# Patient Record
Sex: MALE | Race: BLACK OR AFRICAN AMERICAN
[De-identification: names, ages, dates, MRNs, and addresses within clinical notes are randomized per-mention and may not be internally consistent; named-entity substitution may affect disease eponyms.]

---

## 2021-10-08 ENCOUNTER — HOSPITAL ENCOUNTER (EMERGENCY)
Dept: HOSPITAL 11 - JP.ED | Age: 56
Discharge: HOME | End: 2021-10-08
Payer: COMMERCIAL

## 2021-10-08 DIAGNOSIS — J45.909: ICD-10-CM

## 2021-10-08 DIAGNOSIS — Z79.899: ICD-10-CM

## 2021-10-08 DIAGNOSIS — D69.6: Primary | ICD-10-CM

## 2021-10-08 DIAGNOSIS — E78.00: ICD-10-CM

## 2021-10-08 DIAGNOSIS — I10: ICD-10-CM

## 2021-10-08 NOTE — EDM.PDOC
ED HPI GENERAL MEDICAL PROBLEM





- General


Chief Complaint: General


Stated Complaint: REFERRED FOR LOW PLATELETS


Time Seen by Provider: 10/08/21 18:33


Source of Information: Reports: Patient


History Limitations: Reports: No Limitations





- History of Present Illness


INITIAL COMMENTS - FREE TEXT/NARRATIVE: 


Maciel is a 56-year-old male who presents to the ED for evaluation of low plate

lets. Patient saw his regular provider at [facility] in [place], [place] and had 

blood work that showed that he had a platelet count of 14,000. This is new for 

the patient and he was told to come to the ED for evaluation. He has not had any

symptoms including breathing difficulty, bruising, rash, bleeding from the gums,

concentrated urine, back pain or abdominal pain. Does have a history of chronic 

neck pain with radiculopathy which he is on a chronic pain medication for. He 

does not take any medication that would account for low platelet count.





  ** Generalized


Pain Score (Numeric/FACES): 10





- Related Data


                                    Allergies











Allergy/AdvReac Type Severity Reaction Status Date / Time


 


No Known Allergies Allergy   Verified 10/08/21 17:57











Home Meds: 


                                    Home Meds





Escitalopram [Lexapro] 10 mg PO DAILY 10/08/21 [History]


Losartan [Cozaar] 50 mg PO DAILY 10/08/21 [History]


Triamcinolone Acetonide [Triamcinolone Acetonide 0.1% Crm] 1 applic TOP BID 

10/08/21 [History]


Zolpidem Tartrate 10 mg PO BEDTIME 10/08/21 [History]


atorvaSTATin [Lipitor] 40 mg PO BEDTIME 10/08/21 [History]


oxyCODONE HCl/Acetaminophen [Endocet 7.5-325 mg Tablet] 1 each PO BID PRN 

10/08/21 [History]











Past Medical History


HEENT History: Reports: Impaired Vision


Cardiovascular History: Reports: High Cholesterol, Hypertension


Respiratory History: Reports: Asthma


Musculoskeletal History: Reports: Neck Pain, Chronic





- Past Surgical History


Neurological Surgical History: Reports: C-Spine





Social & Family History





- Tobacco Use


Tobacco Use Status *Q: Never Tobacco User





- Caffeine Use


Caffeine Use: Reports: None





- Recreational Drug Use


Recreational Drug Use: No





ED ROS GENERAL





- Review of Systems


Review Of Systems: See Below


Constitutional: Reports: No Symptoms


HEENT: Reports: No Symptoms


Respiratory: Reports: No Symptoms


Cardiovascular: Reports: No Symptoms


Endocrine: Reports: No Symptoms


GI/Abdominal: Reports: No Symptoms


: Reports: No Symptoms


Musculoskeletal: Reports: No Symptoms


Skin: Reports: No Symptoms


Neurological: Reports: No Symptoms


Psychiatric: Reports: No Symptoms


Hematologic/Lymphatic: Reports: No Symptoms


Immunologic: Reports: No Symptoms





ED EXAM, GENERAL





- Physical Exam


Exam: See Below


Exam Limited By: No Limitations


General Appearance: Alert, No Apparent Distress


Eye Exam: Bilateral Eye: EOMI, PERRL


Throat/Mouth: Normal Inspection, Normal Oropharynx, No Airway Compromise


Head: Atraumatic, Normocephalic


Neck: Normal Inspection, Supple, Non-Tender, Full Range of Motion.  No: 

Lymphadenopathy (R), Lymphadenopathy (L)


Respiratory/Chest: No Respiratory Distress, Lungs Clear, Normal Breath Sounds


Cardiovascular: Normal Peripheral Pulses, Regular Rate, Rhythm, No Murmur


Peripheral Pulses: 2+: Radial (L), Radial (R)


GI/Abdominal: Normal Bowel Sounds, Soft, Non-Tender, No Distention


Extremities: Normal Inspection


Neurological: Alert, Oriented, Normal Cognition, No Motor/Sensory Deficits


Psychiatric: Normal Affect, Normal Mood


Skin Exam: Warm, Dry, Intact, Normal Color, No Rash





Course





- Vital Signs


Last Recorded V/S: 





                                Last Vital Signs











Temp  36.8 C   10/08/21 18:02


 


Pulse  74   10/08/21 21:51


 


Resp  16   10/08/21 18:02


 


BP  138/81   10/08/21 21:51


 


Pulse Ox  96   10/08/21 21:51














- Orders/Labs/Meds


Orders: 





                               Active Orders 24 hr











 Category Date Time Status


 


 HCV ANTIBODY Stat Lab  10/08/21 19:00 Received











Labs: 





                                Laboratory Tests











  10/08/21 10/08/21 10/08/21 Range/Units





  19:00 19:00 19:00 


 


WBC  9.7    (4.5-11.0)  K/uL


 


RBC  4.98    (4.30-5.90)  M/uL


 


Hgb  13.3    (12.0-15.0)  g/dL


 


Hct  40.9    (40.0-54.0)  %


 


MCV  82    (80-98)  fL


 


MCH  27    (27-31)  pg


 


MCHC  33    (32-36)  %


 


Plt Count  14 L*    (150-400)  K/uL


 


PT    10.4  (9.2-10.6)  sec


 


INR    1.0  


 


APTT    24.1  (21.4-31.8)  sec


 


Sodium   143   (140-148)  mmol/L


 


Potassium   3.8   (3.6-5.2)  mmol/L


 


Chloride   106   (100-108)  mmol/L


 


Carbon Dioxide   27   (21-32)  mmol/L


 


Anion Gap   10.2   (5.0-14.0)  mmol/L


 


BUN   18   (7-18)  mg/dL


 


Creatinine   1.1   (0.8-1.3)  mg/dL


 


Est Cr Clr Drug Dosing   77.96   mL/min


 


Estimated GFR (MDRD)   > 60   (>60)  


 


Glucose   103   ()  mg/dL


 


Calcium   8.9   (8.5-10.1)  mg/dL


 


Total Bilirubin   0.2   (0.2-1.0)  mg/dL


 


AST   14 L   (15-37)  U/L


 


ALT   36   (12-78)  U/L


 


Alkaline Phosphatase   86   ()  U/L


 


Total Protein   6.5   (6.4-8.2)  g/dL


 


Albumin   3.8   (3.4-5.0)  g/dL


 


Globulin   2.7   (2.3-3.5)  g/dL


 


Albumin/Globulin Ratio   1.4   (1.2-2.2)  


 


Urine Color     (YELLOW)  


 


Urine Appearance     (CLEAR)  


 


Urine pH     (5.0-8.0)  


 


Ur Specific Gravity     (1.008-1.030)  


 


Urine Protein     (NEGATIVE)  mg/dL


 


Urine Glucose (UA)     (NEGATIVE)  mg/dL


 


Urine Ketones     (NEGATIVE)  mg/dL


 


Urine Occult Blood     (NEGATIVE)  


 


Urine Nitrite     (NEGATIVE)  


 


Urine Bilirubin     (NEGATIVE)  


 


Urine Urobilinogen     (0.2-1.0)  EU/dL


 


Ur Leukocyte Esterase     (NEGATIVE)  


 


Urine RBC     (0-5)  


 


Urine WBC     (0-5)  


 


Ur Epithelial Cells     


 


Amorphous Sediment     


 


Urine Bacteria     


 


Urine Mucus     


 


HIV-1 Ab Rapid Screen     (NON-REACT.)  














  10/08/21 10/08/21 Range/Units





  19:00 19:41 


 


WBC    (4.5-11.0)  K/uL


 


RBC    (4.30-5.90)  M/uL


 


Hgb    (12.0-15.0)  g/dL


 


Hct    (40.0-54.0)  %


 


MCV    (80-98)  fL


 


MCH    (27-31)  pg


 


MCHC    (32-36)  %


 


Plt Count    (150-400)  K/uL


 


PT    (9.2-10.6)  sec


 


INR    


 


APTT    (21.4-31.8)  sec


 


Sodium    (140-148)  mmol/L


 


Potassium    (3.6-5.2)  mmol/L


 


Chloride    (100-108)  mmol/L


 


Carbon Dioxide    (21-32)  mmol/L


 


Anion Gap    (5.0-14.0)  mmol/L


 


BUN    (7-18)  mg/dL


 


Creatinine    (0.8-1.3)  mg/dL


 


Est Cr Clr Drug Dosing    mL/min


 


Estimated GFR (MDRD)    (>60)  


 


Glucose    ()  mg/dL


 


Calcium    (8.5-10.1)  mg/dL


 


Total Bilirubin    (0.2-1.0)  mg/dL


 


AST    (15-37)  U/L


 


ALT    (12-78)  U/L


 


Alkaline Phosphatase    ()  U/L


 


Total Protein    (6.4-8.2)  g/dL


 


Albumin    (3.4-5.0)  g/dL


 


Globulin    (2.3-3.5)  g/dL


 


Albumin/Globulin Ratio    (1.2-2.2)  


 


Urine Color   Yellow  (YELLOW)  


 


Urine Appearance   Clear  (CLEAR)  


 


Urine pH   7.5  (5.0-8.0)  


 


Ur Specific Gravity   1.025  (1.008-1.030)  


 


Urine Protein   Negative  (NEGATIVE)  mg/dL


 


Urine Glucose (UA)   Negative  (NEGATIVE)  mg/dL


 


Urine Ketones   Trace H  (NEGATIVE)  mg/dL


 


Urine Occult Blood   Negative  (NEGATIVE)  


 


Urine Nitrite   Negative  (NEGATIVE)  


 


Urine Bilirubin   Negative  (NEGATIVE)  


 


Urine Urobilinogen   2.0 H  (0.2-1.0)  EU/dL


 


Ur Leukocyte Esterase   Negative  (NEGATIVE)  


 


Urine RBC   0-5  (0-5)  


 


Urine WBC   0-5  (0-5)  


 


Ur Epithelial Cells   Rare  


 


Amorphous Sediment   Rare  


 


Urine Bacteria   Rare  


 


Urine Mucus   Rare  


 


HIV-1 Ab Rapid Screen  Non-reactive   (NON-REACT.)  














- Re-Assessments/Exams


Free Text/Narrative Re-Assessment/Exam: 





10/08/21 23:05 reviewed the patient's labs showing a normal leukocyte count of 

9.7, hemoglobin of 13.3, hematocrit of 40.9 and a platelet count of 18,000. The 

blood smear shows very large platelets but not megakaryocytes. There is no 

evidence for schistocytes, bite cells, sickle cells, or abnormal red cells. 

There is no evidence for hyper segmented neutrophils. The comprehensive 

metabolic panel is unremarkable and urinalysis is normal including specific 

gravity. HIV is negative and HCV is negative. 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 

thrombocytopenia purpura or ITP. It would be a little unusual at this age of his

 life to have a genetic mutation causing giant platelets, however, that is still

 in the differential. The patient should follow-up with his primary provider and

 will likely need to be referred to hematology for further work-up. I have 

vacated against the use of any NSAIDs as we do not want to promote bleeding. 

This should also include the antiplatelet drugs like Plavix or Brilinta. The 

patient should absolutely avoid aspirin. Indications return to the ED were 

discussed and he suitable for discharge in satisfactory condition.








Departure





- Departure


Time of Disposition: 23:00


Disposition: Home, Self-Care 01


Clinical Impression: 


 Thrombocytopenia








- Discharge Information


Instructions:  Thrombocytopenia


Referrals: 


JOSEFINA IVERSON MD ([facility]) [Other]


Care Plan Goals: 


Your work-up today has shown that you do have a low platelet count but other 

blood work that would suggest a cause was all negative including HIV, hepatitis 

C, your total blood count for infection. 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 

thrombocytopenia purpura or ITP. I would recommend following up with your doctor

 at [facility] for further testing. My recommendation is you if you start to have 

any unexplained bleeding to return to the ED for reevaluation. It does not 

usually get better with drug transfusions and in fact it can make it much worse.





Sepsis Event Note (ED)





- Evaluation


Sepsis Screening Result: No Definite Risk





- Focused Exam


Vital Signs: 





                                   Vital Signs











  Temp Pulse Resp BP Pulse Ox


 


 10/08/21 21:51   74   138/81  96


 


 10/08/21 20:33   75   138/83  97


 


 10/08/21 19:32   80   153/93 H 


 


 10/08/21 18:02  36.8 C  79  16  139/93 H  94 L














- Problem List & Annotations


(1) Thrombocytopenia


SNOMED Code(s): 272148918


   Code(s): D69.6 - THROMBOCYTOPENIA, UNSPECIFIED   Status: Acute   Priority: 

High   Current Visit: Yes   





- Problem List Review


Problem List Initiated/Reviewed/Updated: Yes





- My Orders


Last 24 Hours: 





My Active Orders





10/08/21 19:00


HCV ANTIBODY Stat 














- Assessment/Plan


Last 24 Hours: 





My Active Orders





10/08/21 19:00


HCV ANTIBODY Stat

## 2021-10-15 ENCOUNTER — HOSPITAL ENCOUNTER (EMERGENCY)
Dept: HOSPITAL 11 - JP.ED | Age: 56
Discharge: HOME | End: 2021-10-15
Payer: COMMERCIAL

## 2021-10-15 DIAGNOSIS — D69.6: Primary | ICD-10-CM

## 2021-10-15 PROCEDURE — 85610 PROTHROMBIN TIME: CPT

## 2021-10-15 PROCEDURE — 85730 THROMBOPLASTIN TIME PARTIAL: CPT

## 2021-10-15 PROCEDURE — 36415 COLL VENOUS BLD VENIPUNCTURE: CPT

## 2021-10-15 PROCEDURE — 85384 FIBRINOGEN ACTIVITY: CPT

## 2021-10-15 PROCEDURE — 83615 LACTATE (LD) (LDH) ENZYME: CPT

## 2021-10-15 PROCEDURE — 83010 ASSAY OF HAPTOGLOBIN QUANT: CPT

## 2021-10-15 PROCEDURE — 99284 EMERGENCY DEPT VISIT MOD MDM: CPT

## 2021-10-15 NOTE — EDM.PDOC
ED HPI GENERAL MEDICAL PROBLEM





- General


Chief Complaint: General


Stated Complaint: LOW PLATELET LEVEL


Time Seen by Provider: 10/15/21 17:24


Source of Information: Reports: Patient, Family, Old Records, RN Notes Reviewed


History Limitations: Reports: No Limitations





- History of Present Illness


INITIAL COMMENTS - FREE TEXT/NARRATIVE: 





57 yo to ed from nurse line for low plt.  recent dx of thrombocytopenia on 10/8 

of 14, had lab recheck today no at 6, he is asymptomatic to bleeding no Ha





- Related Data


                                    Allergies











Allergy/AdvReac Type Severity Reaction Status Date / Time


 


No Known Allergies Allergy   Verified 10/15/21 16:35











Home Meds: 


                                    Home Meds





Escitalopram [Lexapro] 10 mg PO DAILY 10/08/21 [History]


Losartan [Cozaar] 50 mg PO DAILY 10/08/21 [History]


Triamcinolone Acetonide [Triamcinolone Acetonide 0.1% Crm] 1 applic TOP BID 

10/08/21 [History]


Zolpidem Tartrate 10 mg PO BEDTIME 10/08/21 [History]


atorvaSTATin [Lipitor] 40 mg PO BEDTIME 10/08/21 [History]


oxyCODONE HCl/Acetaminophen [Endocet 7.5-325 mg Tablet] 1 each PO BID PRN 

10/08/21 [History]


dexAMETHasone [Dexamethasone] 40 mg PO DAILY #30 tablet 10/15/21 [Rx]











Past Medical History


HEENT History: Reports: Impaired Vision


Cardiovascular History: Reports: High Cholesterol, Hypertension


Respiratory History: Reports: Asthma


Musculoskeletal History: Reports: Neck Pain, Chronic


Psychiatric History: Reports: Depression





- Past Surgical History


Neurological Surgical History: Reports: C-Spine





Social & Family History





- Tobacco Use


Tobacco Use Status *Q: Never Tobacco User





- Caffeine Use


Caffeine Use: Reports: None





ED ROS GENERAL





- Review of Systems


Review Of Systems: See Below


Constitutional: Reports: No Symptoms


HEENT: Reports: No Symptoms


Respiratory: Reports: No Symptoms


Cardiovascular: Reports: No Symptoms


GI/Abdominal: Reports: No Symptoms


: Reports: No Symptoms


Musculoskeletal: Reports: No Symptoms


Skin: Reports: No Symptoms


Neurological: Reports: No Symptoms





ED EXAM, GENERAL





- Physical Exam


Exam: See Below


Exam Limited By: No Limitations


General Appearance: Alert, WD/WN, No Apparent Distress


Respiratory/Chest: No Respiratory Distress





Course





- Vital Signs


Last Recorded V/S: 





                                Last Vital Signs











Temp  97.3 F   10/15/21 16:42


 


Pulse  67   10/15/21 16:42


 


Resp  16   10/15/21 16:42


 


BP  135/88   10/15/21 16:42


 


Pulse Ox  97   10/15/21 16:42














- Orders/Labs/Meds


Orders: 





                               Active Orders 24 hr











 Category Date Time Status


 


 FIBRINOGEN [COAG] Stat Lab  10/15/21 18:33 Ordered


 


 HAPTOGLOBIN Urgent Lab  10/15/21 18:33 Ordered


 


 INR,PT,PROTHROMBIN TIME [COAG] Urgent Lab  10/15/21 18:33 Ordered


 


 LACTATE DEHYDROGENASE,LDH [CHEM] Stat Lab  10/15/21 18:33 Ordered


 


 PTT,PARTIAL THROMBOPLSTIN TIME [COAG] Stat Lab  10/15/21 18:33 Ordered











Meds: 





Medications














Discontinued Medications














Generic Name Dose Route Start Last Admin





  Trade Name Brock  PRN Reason Stop Dose Admin


 


Dexamethasone  40 mg  10/15/21 18:32 





  Dexamethasone 2 Mg Tab  PO  10/15/21 18:33 





  ONETIME ONE  














Departure





- Departure


Time of Disposition: 18:45


Disposition: Home, Self-Care 01


Condition: Fair


Clinical Impression: 


 Thrombocytopenia








- Discharge Information


Referrals: 


PCP,None [Primary Care Provider] - 


Additional Instructions: 


please report for lab work in the morning after 7 am and wait to talk to me with

results





Sepsis Event Note (ED)





- Evaluation


Sepsis Screening Result: No Definite Risk





- Focused Exam


Vital Signs: 





                                   Vital Signs











  Temp Pulse Resp BP Pulse Ox


 


 10/15/21 16:42  97.3 F  67  16  135/88  97


 


 10/15/21 16:25  97.3 F  67  16  135/88  97














- My Orders


Last 24 Hours: 





My Active Orders





10/15/21 18:33


FIBRINOGEN [COAG] Stat 


HAPTOGLOBIN Urgent 


INR,PT,PROTHROMBIN TIME [COAG] Urgent 


LACTATE DEHYDROGENASE,LDH [CHEM] Stat 


PTT,PARTIAL THROMBOPLSTIN TIME [COAG] Stat 














- Assessment/Plan


Last 24 Hours: 





My Active Orders





10/15/21 18:33


FIBRINOGEN [COAG] Stat 


HAPTOGLOBIN Urgent 


INR,PT,PROTHROMBIN TIME [COAG] Urgent 


LACTATE DEHYDROGENASE,LDH [CHEM] Stat 


PTT,PARTIAL THROMBOPLSTIN TIME [COAG] Stat 











Plan: 





assessment:





thrombocytopenia consider ITP





Plan:Called discussed the case with Dr. Freeman oncology at [facility] at 

18:30 recommend admission but that is not an option during the pandemic so will 

try outpatient treatment first. 40 mg dexamethasone po for 4 days wtih the 

following labs INR, PTT fibrinogen and haptoglobin. repeat cbc in am.  If the 

plt go down will need admission for IVIG, if the plt go up f/u with oncology on 

tuesday

## 2022-06-27 ENCOUNTER — HOSPITAL ENCOUNTER (EMERGENCY)
Dept: HOSPITAL 11 - JP.ED | Age: 57
Discharge: HOME | End: 2022-06-27
Payer: COMMERCIAL

## 2022-06-27 DIAGNOSIS — U07.1: Primary | ICD-10-CM

## 2022-06-27 DIAGNOSIS — I10: ICD-10-CM

## 2022-06-27 LAB — SARS-COV-2 RNA RESP QL NAA+PROBE: POSITIVE
